# Patient Record
Sex: FEMALE | URBAN - METROPOLITAN AREA
[De-identification: names, ages, dates, MRNs, and addresses within clinical notes are randomized per-mention and may not be internally consistent; named-entity substitution may affect disease eponyms.]

---

## 2018-05-07 ENCOUNTER — TELEPHONE (OUTPATIENT)
Dept: OTHER | Facility: CLINIC | Age: 58
End: 2018-05-07

## 2018-05-07 NOTE — TELEPHONE ENCOUNTER
5/7/2018    Call Regarding Onboarding ARE CHOICES    Attempt 1    Message on voicemail     Comments:           Outreach   AT

## 2018-05-30 NOTE — TELEPHONE ENCOUNTER
5/30/2018    Call Regarding Onboarding: [facility] Choices    Attempt 2    Message on voicemail     Comments:       Outreach   SV